# Patient Record
Sex: MALE | Race: WHITE
[De-identification: names, ages, dates, MRNs, and addresses within clinical notes are randomized per-mention and may not be internally consistent; named-entity substitution may affect disease eponyms.]

---

## 2018-07-07 ENCOUNTER — HOSPITAL ENCOUNTER (EMERGENCY)
Dept: HOSPITAL 46 - ED | Age: 33
Discharge: HOME | End: 2018-07-07
Payer: COMMERCIAL

## 2018-07-07 VITALS — BODY MASS INDEX: 30.48 KG/M2 | WEIGHT: 230.01 LBS | HEIGHT: 73 IN

## 2018-07-07 DIAGNOSIS — K21.9: ICD-10-CM

## 2018-07-07 DIAGNOSIS — F17.200: ICD-10-CM

## 2018-07-07 DIAGNOSIS — R07.9: Primary | ICD-10-CM

## 2018-07-08 NOTE — EKG
[facility]
                                    [address]
                                  [place], [place]  [address]
_________________________________________________________________________________________
                                                                 Signed   
 
 
Sinus rhythm with marked sinus arrhythmia
Otherwise normal ECG
No previous ECGs available
Confirmed by ALMA BRIGHT MD ([id number]) on 7/8/2018 10:40:26 AM
 
 
 
 
 
 
 
 
 
 
 
 
 
 
 
 
 
 
 
 
 
 
 
 
 
 
 
 
 
 
 
 
 
 
 
 
 
 
 
 
 
    Electronically Signed By: ALMA BRIGHT MD  07/08/18 1040
_________________________________________________________________________________________
PATIENT NAME:     CORAL VARNER              
MEDICAL RECORD #: [id number]                     Electrocardiogram             
          ACCT #: [id number]  
DATE OF BIRTH:   [birth year]                                       
PHYSICIAN:   ALMA BRIGHT MD           REPORT #: [id number]
REPORT IS CONFIDENTIAL AND NOT TO BE RELEASED WITHOUT AUTHORIZATION

## 2018-09-20 ENCOUNTER — HOSPITAL ENCOUNTER (EMERGENCY)
Dept: HOSPITAL 46 - ED | Age: 33
Discharge: HOME | End: 2018-09-20
Payer: COMMERCIAL

## 2018-09-20 VITALS — BODY MASS INDEX: 27.83 KG/M2 | WEIGHT: 209.99 LBS | HEIGHT: 73 IN

## 2018-09-20 DIAGNOSIS — L02.412: Primary | ICD-10-CM

## 2018-09-20 DIAGNOSIS — F17.200: ICD-10-CM

## 2018-09-20 NOTE — XMS
PreManage Notification: CORAL VARNER MRN:[id number]
 
Security Information
 
Security Events
No recent Security Events currently on file
 
 
 
CRITERIA MET
------------
- Group Notification
 
 
CARE PROVIDERS
There are no care providers on record at this time.
 
[facility] has no Care Guidelines for this patient.
Care History
Medical/Surgical
07/09/2018    [facility]
 
      - W is unable to contact patient to help with PCP set up. 
      - Please refer patient to [facility] in [place]. Phone # 
      [phone]
E.D. VISIT COUNT (12 MO.)
-------------------------------------------------------------------------------------
2 [facility]
-------------------------------------------------------------------------------------
TOTAL 2
-------------------------------------------------------------------------------------
NOTE: Visits indicate total known visits.
 
ED/UCC VISIT TRACKING (12 MO.)
-------------------------------------------------------------------------------------
09/20/2018 21:01
[facility]
 
TYPE: Emergency
 
COMPLAINT:
- RASH
-------------------------------------------------------------------------------------
07/07/2018 20:55
[facility]
 
TYPE: Emergency
 
COMPLAINT:
- L SIDED CHEST PAIN
 
DIAGNOSES:
- Chest pain, unspecified
- Gastro-esophageal reflux disease without esophagitis
- Nicotine dependence, unspecified, uncomplicated
-------------------------------------------------------------------------------------
 
 
INPATIENT VISIT TRACKING (12 MO.)
 
No inpatient visits to display in this time frame
 
[email or web address]

## 2019-02-06 ENCOUNTER — HOSPITAL ENCOUNTER (EMERGENCY)
Dept: HOSPITAL 46 - ED | Age: 34
Discharge: HOME | End: 2019-02-06
Payer: COMMERCIAL

## 2019-02-06 VITALS — WEIGHT: 209.99 LBS | BODY MASS INDEX: 27.83 KG/M2 | HEIGHT: 73 IN

## 2019-02-06 DIAGNOSIS — K08.89: Primary | ICD-10-CM

## 2019-02-06 NOTE — XMS
PreManage Notification: CORAL VARNER MRN:[id number]
 
Security Information
 
Security Events
No recent Security Events currently on file
 
 
 
CRITERIA MET
------------
- [facility] - Has Care Guidelines
 
 
CARE PROVIDERS
There are no care providers on record at this time.
 
[facility] has no Care Guidelines for this patient.
Care History
Medical/Surgical
07/09/2018    [facility]
 
      - [facility] is unable to contact patient to help with PCP set up. 
      - Please refer patient to [facility] in [place]. Phone # 
      [phone]
E.D. VISIT COUNT (12 MO.)
-------------------------------------------------------------------------------------
3 [facility]
-------------------------------------------------------------------------------------
TOTAL 3
-------------------------------------------------------------------------------------
NOTE: Visits indicate total known visits.
 
ED/UCC VISIT TRACKING (12 MO.)
-------------------------------------------------------------------------------------
02/06/2019 15:34
[facility]
 
TYPE: Emergency
 
COMPLAINT:
- DENTAL PROBLEM
-------------------------------------------------------------------------------------
09/20/2018 21:01
[facility]
 
TYPE: Emergency
 
COMPLAINT:
- RASH
 
DIAGNOSES:
- Rash and other nonspecific skin eruption
- Nicotine dependence, unspecified, uncomplicated
- Cutaneous abscess of left axilla
-------------------------------------------------------------------------------------
07/07/2018 20:55
[facility]
 
 
TYPE: Emergency
 
COMPLAINT:
- L SIDED CHEST PAIN
 
DIAGNOSES:
- Chest pain, unspecified
- Gastro-esophageal reflux disease without esophagitis
- Nicotine dependence, unspecified, uncomplicated
-------------------------------------------------------------------------------------
 
 
INPATIENT VISIT TRACKING (12 MO.)
No inpatient visits to display in this time frame
 
[email or web address]